# Patient Record
Sex: FEMALE | URBAN - METROPOLITAN AREA
[De-identification: names, ages, dates, MRNs, and addresses within clinical notes are randomized per-mention and may not be internally consistent; named-entity substitution may affect disease eponyms.]

---

## 2018-11-12 ENCOUNTER — APPOINTMENT (RX ONLY)
Dept: URBAN - METROPOLITAN AREA CLINIC 17 | Facility: CLINIC | Age: 68
Setting detail: DERMATOLOGY
End: 2018-11-12

## 2018-11-12 DIAGNOSIS — I83.9 ASYMPTOMATIC VARICOSE VEINS OF LOWER EXTREMITIES: ICD-10-CM

## 2018-11-12 DIAGNOSIS — Z41.9 ENCOUNTER FOR PROCEDURE FOR PURPOSES OTHER THAN REMEDYING HEALTH STATE, UNSPECIFIED: ICD-10-CM

## 2018-11-12 DIAGNOSIS — L81.4 OTHER MELANIN HYPERPIGMENTATION: ICD-10-CM

## 2018-11-12 PROBLEM — I83.90 ASYMPTOMATIC VARICOSE VEINS OF UNSPECIFIED LOWER EXTREMITY: Status: ACTIVE | Noted: 2018-11-12

## 2018-11-12 PROCEDURE — ? COSMETIC CONSULTATION - MICRO-NEEDLING

## 2018-11-12 PROCEDURE — ? OTHER

## 2018-11-12 PROCEDURE — ? PRESCRIPTION

## 2018-11-12 PROCEDURE — 99202 OFFICE O/P NEW SF 15 MIN: CPT

## 2018-11-12 PROCEDURE — ? PRODUCT LINE (PHARMACEUTICALS)

## 2018-11-12 PROCEDURE — ? COUNSELING

## 2018-11-12 RX ORDER — TRETINOIN 0.25 MG/G
CREAM TOPICAL QHS
Qty: 1 | Refills: 7 | Status: ERX | COMMUNITY
Start: 2018-11-12

## 2018-11-12 RX ADMIN — TRETINOIN: 0.25 CREAM TOPICAL at 19:17

## 2018-11-12 ASSESSMENT — LOCATION SIMPLE DESCRIPTION DERM
LOCATION SIMPLE: LEFT CHEEK
LOCATION SIMPLE: RIGHT CHEEK

## 2018-11-12 ASSESSMENT — LOCATION DETAILED DESCRIPTION DERM
LOCATION DETAILED: LEFT CENTRAL MALAR CHEEK
LOCATION DETAILED: RIGHT INFERIOR CENTRAL MALAR CHEEK

## 2018-11-12 ASSESSMENT — LOCATION ZONE DERM: LOCATION ZONE: FACE

## 2018-11-12 NOTE — PROCEDURE: PRODUCT LINE (PHARMACEUTICALS)
Product 4 Units: 0
Product 23 Price (In Dollars - Numeric Only, No Special Characters Or $): 0.00
Product 11 Price (In Dollars - Numeric Only, No Special Characters Or $): 40.00
Product 10 Units: 1
Name Of Product 9: Retin A 0.05%
Name Of Product 12: Tazarotene 0.05%
Product 4 Price (In Dollars - Numeric Only, No Special Characters Or $): 50.00
Product 6 Application Directions: Apply affected areas QD to BID
Name Of Product 4: Melasma Emulsion
Detail Level: Zone
Product 4 Application Directions: Apply to areas of dark pigmentation once daily for 1-2 months only
Name Of Product 1: Clobetasol Cream
Name Of Product 13: Econazole cream
Product 13 Price (In Dollars - Numeric Only, No Special Characters Or $): 30.00
Product 8 Application Directions: Apply to the face QHS or as tolerated
Name Of Product 11: Azeloyl hydrating cream
Name Of Product 7: Acne triple gel
Name Of Product 5: Rosacea triple gel
Name Of Product 8: Retin A .01%
Render Product Pricing In Note: Yes
Name Of Product 2: Clobetasol Solution
Name Of Product 14: Azelaic
Product 2 Application Directions: Apply affected areas QD-BID
Name Of Product 10: Retin-A 0.025%
Name Of Product 3: Clobetasol Shampoo
Product 11 Application Directions: Apply to the face QHS
Product 10 Application Directions: Apply a pea size amount to the face QHS or as tolerated
Name Of Product 6: Seborrheic Dermatitis Cream
Product 3 Application Directions: Wash affected area of scalp once daily
Product 12 Application Directions: Apply to face QHS
Product 5 Application Directions: Apply affected area QD-BID

## 2018-11-12 NOTE — PROCEDURE: OTHER
Note Text (......Xxx Chief Complaint.): This diagnosis correlates with the history of actinic keratosis.
Detail Level: Zone
Other (Free Text): Patient wants to use the IPL laser for removal of spots
Other (Free Text): Too picture to refer to [facility] for Cutera laser
Other (Free Text): Patient called pharmacy and canceled prescription. Bought product here